# Patient Record
Sex: FEMALE | Race: BLACK OR AFRICAN AMERICAN | Employment: OTHER | URBAN - METROPOLITAN AREA
[De-identification: names, ages, dates, MRNs, and addresses within clinical notes are randomized per-mention and may not be internally consistent; named-entity substitution may affect disease eponyms.]

---

## 2018-02-22 ENCOUNTER — OFFICE VISIT (OUTPATIENT)
Dept: OBGYN CLINIC | Age: 58
End: 2018-02-22

## 2018-02-22 VITALS
DIASTOLIC BLOOD PRESSURE: 85 MMHG | HEIGHT: 65 IN | HEART RATE: 58 BPM | SYSTOLIC BLOOD PRESSURE: 142 MMHG | WEIGHT: 163 LBS | BODY MASS INDEX: 27.16 KG/M2

## 2018-02-22 DIAGNOSIS — Z01.419 WELL WOMAN EXAM WITH ROUTINE GYNECOLOGICAL EXAM: Primary | ICD-10-CM

## 2018-02-22 NOTE — MR AVS SNAPSHOT
Basilia Esquivel [facility] 879 [address] [facility] 320 [facility] 83 [address] 
[phone] Patient: Shania Plaza MRN: [id number] :1960 Visit Information Date & Time Provider Department Dept. Phone Encounter #  
 2018  2:00 PM Basil Martinez MD [facility] OB/GYN [place] [phone] [id number] Follow-up Instructions Return in about 2 years (around 2020). Upcoming Health Maintenance Date Due Hepatitis C Screening 1960 DTaP/Tdap/Td series (1 - Tdap) 1981 PAP AKA CERVICAL CYTOLOGY 1981 BREAST CANCER SCRN MAMMOGRAM 2010 FOBT Q 1 YEAR AGE 50-75 2010 Influenza Age 5 to Adult 2017 Allergies as of 2018  Review Complete On: 2018 By: Mone Patrick Severity Noted Reaction Type Reactions Augmentin [Amoxicillin-pot Clavulanate]  2018    Hives Egg  2018    Hives Milk  2018    Hives Shellfish Derived  2018    Hives Current Immunizations  Never Reviewed No immunizations on file. Not reviewed this visit You Were Diagnosed With   
  
 Codes Comments Well woman exam with routine gynecological exam    -  Primary ICD-10-CM: Q36.754 ICD-9-CM: V72.31 Vitals BP Pulse Height(growth percentile) Weight(growth percentile) BMI OB Status 142/85 (!) 58 5' 5\" (1.651 m) 163 lb (73.9 kg) 27.12 kg/m2 Hysterectomy Smoking Status Never Smoker BMI and BSA Data Body Mass Index Body Surface Area  
 27.12 kg/m 2 1.84 m 2 Your Updated Medication List  
  
Notice  As of 2018  3:15 PM  
 You have not been prescribed any medications. Follow-up Instructions Return in about 2 years (around 2020). Patient Instructions Well Visit, Women 48 to 72: Care Instructions Your Care Instructions Physical exams can help you stay healthy.  Your doctor has checked your overall health and may have suggested ways to take good care of yourself. He or she also may have recommended tests. At home, you can help prevent illness with healthy eating, regular exercise, and other steps. Follow-up care is a key part of your treatment and safety. Be sure to make and go to all appointments, and call your doctor if you are having problems. It's also a good idea to know your test results and keep a list of the medicines you take. How can you care for yourself at home? · Reach and stay at a healthy weight. This will lower your risk for many problems, such as obesity, diabetes, heart disease, and high blood pressure. · Get at least 30 minutes of exercise on most days of the week. Walking is a good choice. You also may want to do other activities, such as running, swimming, cycling, or playing tennis or team sports. · Do not smoke. Smoking can make health problems worse. If you need help quitting, talk to your doctor about stop-smoking programs and medicines. These can increase your chances of quitting for good. · Protect your skin from too much sun. When you're outdoors from 10 a.m. to 4 p.m., stay in the shade or cover up with clothing and a hat with a wide brim. Wear sunglasses that block UV rays. Even when it's cloudy, put broad-spectrum sunscreen (SPF 30 or higher) on any exposed skin. · See a dentist one or two times a year for checkups and to have your teeth cleaned. · Wear a seat belt in the car. · Limit alcohol to 1 drink a day. Too much alcohol can cause health problems. Follow your doctor's advice about when to have certain tests. These tests can spot problems early. · Cholesterol. Your doctor will tell you how often to have this done based on your age, family history, or other things that can increase your risk for heart attack and stroke. · Blood pressure.  Have your blood pressure checked during a routine doctor visit. Your doctor will tell you how often to check your blood pressure based on your age, your blood pressure results, and other factors. · Mammogram. Ask your doctor how often you should have a mammogram, which is an X-ray of your breasts. A mammogram can spot breast cancer before it can be felt and when it is easiest to treat. · Pap test and pelvic exam. Ask your doctor how often you should have a Pap test. You may not need to have a Pap test as often as you used to. · Vision. Have your eyes checked every year or two or as often as your doctor suggests. Some experts recommend that you have yearly exams for glaucoma and other age-related eye problems starting at age 48. · Hearing. Tell your doctor if you notice any change in your hearing. You can have tests to find out how well you hear. · Diabetes. Ask your doctor whether you should have tests for diabetes. · Colon cancer. You should begin tests for colon cancer at age 48. You may have one of several tests. Your doctor will tell you how often to have tests based on your age and risk. Risks include whether you already had a precancerous polyp removed from your colon or whether your parents, sisters and brothers, or children have had colon cancer. · Thyroid disease. Talk to your doctor about whether to have your thyroid checked as part of a regular physical exam. Women have an increased chance of a thyroid problem. · Osteoporosis. You should begin tests for bone density at age 72. If you are younger than 72, ask your doctor whether you have factors that may increase your risk for this disease. You may want to have this test before age 72. · Heart attack and stroke risk. At least every 4 to 6 years, you should have your risk for heart attack and stroke assessed. Your doctor uses factors such as your age, blood pressure, cholesterol, and whether you smoke or have diabetes to show what your risk for a heart attack or stroke is over the next 10 years. When should you call for help? Watch closely for changes in your health, and be sure to contact your doctor if you have any problems or symptoms that concern you. Where can you learn more? Go to http://mendel-amado.info/. Enter M282 in the search box to learn more about \"Well Visit, Women 50 to 72: Care Instructions. \" Current as of: May 12, 2017 Content Version: 11.4 © 1084-9887 [employer]. Care instructions adapted under license by [employer] (which disclaims liability or warranty for this information). If you have questions about a medical condition or this instruction, always ask your healthcare professional. [address] any warranty or liability for your use of this information. Introducing \[facility]\"" & HEALTH SERVICES! Marcia White introduces Jia.com patient portal. Now you can access parts of your medical record, email your doctor's office, and request medication refills online. 1. In your internet browser, go to [email or web address] 2. Click on the First Time User? Click Here link in the Sign In box. You will see the New Member Sign Up page. 3. Enter your Jia.com Access Code exactly as it appears below. You will not need to use this code after youve completed the sign-up process. If you do not sign up before the expiration date, you must request a new code. · Jia.com Access Code: [email or web address] Expires: 5/23/2018  3:14 PM 
 
4. Enter the last four digits of your Social Security Number (xxxx) and Date of Birth (mm/dd/yyyy) as indicated and click Submit. You will be taken to the next sign-up page. 5. Create a Nimsoftt ID. This will be your Jia.com login ID and cannot be changed, so think of one that is secure and easy to remember. 6. Create a Jia.com password. You can change your password at any time. 7. Enter your Password Reset Question and Answer.  This can be used at a later time if you forget your password. 8. Enter your e-mail address. You will receive e-mail notification when new information is available in [address] 9. Click Sign Up. You can now view and download portions of your medical record. 10. Click the Download Summary menu link to download a portable copy of your medical information. If you have questions, please visit the Frequently Asked Questions section of the [employer] website. Remember, [employer] is NOT to be used for urgent needs. For medical emergencies, dial 911. Now available from your iPhone and Android! Please provide this summary of care documentation to your next provider. Your primary care clinician is listed as Tori Starr. If you have any questions after today's visit, please call [phone].

## 2018-02-22 NOTE — PATIENT INSTRUCTIONS
Well Visit, Women 48 to 72: Care Instructions  Your Care Instructions    Physical exams can help you stay healthy. Your doctor has checked your overall health and may have suggested ways to take good care of yourself. He or she also may have recommended tests. At home, you can help prevent illness with healthy eating, regular exercise, and other steps. Follow-up care is a key part of your treatment and safety. Be sure to make and go to all appointments, and call your doctor if you are having problems. It's also a good idea to know your test results and keep a list of the medicines you take. How can you care for yourself at home? · Reach and stay at a healthy weight. This will lower your risk for many problems, such as obesity, diabetes, heart disease, and high blood pressure. · Get at least 30 minutes of exercise on most days of the week. Walking is a good choice. You also may want to do other activities, such as running, swimming, cycling, or playing tennis or team sports. · Do not smoke. Smoking can make health problems worse. If you need help quitting, talk to your doctor about stop-smoking programs and medicines. These can increase your chances of quitting for good. · Protect your skin from too much sun. When you're outdoors from 10 a.m. to 4 p.m., stay in the shade or cover up with clothing and a hat with a wide brim. Wear sunglasses that block UV rays. Even when it's cloudy, put broad-spectrum sunscreen (SPF 30 or higher) on any exposed skin. · See a dentist one or two times a year for checkups and to have your teeth cleaned. · Wear a seat belt in the car. · Limit alcohol to 1 drink a day. Too much alcohol can cause health problems. Follow your doctor's advice about when to have certain tests. These tests can spot problems early. · Cholesterol.  Your doctor will tell you how often to have this done based on your age, family history, or other things that can increase your risk for heart attack and stroke. · Blood pressure. Have your blood pressure checked during a routine doctor visit. Your doctor will tell you how often to check your blood pressure based on your age, your blood pressure results, and other factors. · Mammogram. Ask your doctor how often you should have a mammogram, which is an X-ray of your breasts. A mammogram can spot breast cancer before it can be felt and when it is easiest to treat. · Pap test and pelvic exam. Ask your doctor how often you should have a Pap test. You may not need to have a Pap test as often as you used to. · Vision. Have your eyes checked every year or two or as often as your doctor suggests. Some experts recommend that you have yearly exams for glaucoma and other age-related eye problems starting at age 48. · Hearing. Tell your doctor if you notice any change in your hearing. You can have tests to find out how well you hear. · Diabetes. Ask your doctor whether you should have tests for diabetes. · Colon cancer. You should begin tests for colon cancer at age 48. You may have one of several tests. Your doctor will tell you how often to have tests based on your age and risk. Risks include whether you already had a precancerous polyp removed from your colon or whether your parents, sisters and brothers, or children have had colon cancer. · Thyroid disease. Talk to your doctor about whether to have your thyroid checked as part of a regular physical exam. Women have an increased chance of a thyroid problem. · Osteoporosis. You should begin tests for bone density at age 72. If you are younger than 72, ask your doctor whether you have factors that may increase your risk for this disease. You may want to have this test before age 72. · Heart attack and stroke risk. At least every 4 to 6 years, you should have your risk for heart attack and stroke assessed.  Your doctor uses factors such as your age, blood pressure, cholesterol, and whether you smoke or have diabetes to show what your risk for a heart attack or stroke is over the next 10 years. When should you call for help? Watch closely for changes in your health, and be sure to contact your doctor if you have any problems or symptoms that concern you. Where can you learn more? Go to http://mendel-amado.info/. Enter Z918 in the search box to learn more about \"Well Visit, Women 50 to 72: Care Instructions. \"  Current as of: May 12, 2017  Content Version: 11.4  © 5105-3945 [employer]. Care instructions adapted under license by [employer] (which disclaims liability or warranty for this information). If you have questions about a medical condition or this instruction, always ask your healthcare professional. [address] any warranty or liability for your use of this information.

## 2018-02-22 NOTE — PROGRESS NOTES
Subjective:   62 y.o. female for Well Woman Check. She has no concerns today. She had an insurance change and is due for her yearly exam.  No LMP recorded. Patient has had a hysterectomy. Social History: not sexually active. Pertinent past medical hstory: no history of HTN, DVT, CAD, DM, liver disease, migraines or smoking. There is no problem list on file for this patient. Allergies   Allergen Reactions    Augmentin [Amoxicillin-Pot Clavulanate] Hives    Egg Hives    Milk Hives    Shellfish Derived Hives     History reviewed. No pertinent past medical history. Past Surgical History:   Procedure Laterality Date    HX CHOLECYSTECTOMY      HX ORTHOPAEDIC      right knee cartilage    HX PARTIAL HYSTERECTOMY  1995     Family History   Problem Relation Age of Onset    Colon Cancer Mother     Breast Cancer Sister     Colon Cancer Maternal Grandmother     Colon Cancer Sister      Social History   Substance Use Topics    Smoking status: Never Smoker    Smokeless tobacco: Never Used    Alcohol use No        ROS:  Feeling well. No dyspnea or chest pain on exertion. No abdominal pain, change in bowel habits, black or bloody stools. No urinary tract symptoms. GYN ROS: no breast pain or new or enlarging lumps on self exam. No neurological complaints. Objective:     Visit Vitals    /85    Pulse (!) 58    Ht 5' 5\" (1.651 m)    Wt 163 lb (73.9 kg)    BMI 27.12 kg/m2     The patient appears well, alert, oriented x 3, in no distress. ENT normal.  Neck supple. No adenopathy or thyromegaly. MARIANA. Lungs are clear, good air entry, no wheezes, rhonchi or rales. S1 and S2 normal, no murmurs, regular rate and rhythm. Abdomen soft without tenderness, guarding, mass or organomegaly. Extremities show no edema, normal peripheral pulses. Neurological is normal, no focal findings.     BREAST EXAM: breasts appear normal, no suspicious masses, no skin or nipple changes or axillary nodes    PELVIC EXAM: VULVA: normal appearing vulva with no masses, tenderness or lesions, VAGINA: normal appearing vagina with normal color and discharge, no lesions, CERVIX: surgically absent, UTERUS: surgically absent, vaginal cuff well healed, ADNEXA: normal adnexa in size, nontender and no masses    Assessment/Plan:   well woman- no need for further pap smears based on benign history. Mammogram recently done. counseled on breast self exam, menopause, osteoporosis and adequate intake of calcium and vitamin D  return annually or prn    ICD-10-CM ICD-9-CM    1.  Well woman exam with routine gynecological exam Z01.419 V72.31    .

## 2018-04-10 ENCOUNTER — OFFICE VISIT (OUTPATIENT)
Dept: SURGERY | Age: 58
End: 2018-04-10

## 2018-04-10 VITALS — HEIGHT: 65 IN | RESPIRATION RATE: 16 BRPM | BODY MASS INDEX: 27.16 KG/M2 | WEIGHT: 163 LBS

## 2018-04-10 DIAGNOSIS — K64.4 EXTERNAL HEMORRHOID: ICD-10-CM

## 2018-04-10 DIAGNOSIS — A63.0 ANAL CONDYLOMATA: Primary | ICD-10-CM

## 2018-04-10 PROBLEM — M79.89 LEG SWELLING: Status: ACTIVE | Noted: 2017-11-29

## 2018-04-10 NOTE — MR AVS SNAPSHOT
[address] [facility] 
 
 
 [address] [address] 
[phone] Patient: Jo Ann De Los Santos MRN: [id number] :1960 Visit Information Date & Time Provider Department Dept. Phone Encounter #  
 4/10/2018 11:00 AM Mari Peterson MD [facility] [phone] [id number] Your Appointments 2018  3:30 PM  
POST OP with Mari Peterson MD  
[address] [facility] Appt Note: postop from Anal Condyloma. ..src  
 [address] [facility] [address] [facility]  
  
   
 [address] [facility] [address] [place] [address] Upcoming Health Maintenance Date Due Hepatitis C Screening 1960 DTaP/Tdap/Td series (1 - Tdap) 1981 BREAST CANCER SCRN MAMMOGRAM 2010 FOBT Q 1 YEAR AGE 50-75 2010 PAP AKA CERVICAL CYTOLOGY 2015 Influenza Age 5 to Adult 2017 Allergies as of 4/10/2018  Review Complete On: 4/10/2018 By: Mari Peterson MD  
  
 Severity Noted Reaction Type Reactions Augmentin [Amoxicillin-pot Clavulanate]  2018    Hives Egg  2018    Hives Milk  2018    Hives Shellfish Derived  2018    Hives Current Immunizations  Never Reviewed Name Date Tdap 10/23/2012 12:00 AM  
  
 Not reviewed this visit You Were Diagnosed With   
  
 Codes Comments Anal condylomata    -  Primary ICD-10-CM: A63.0 ICD-9-CM: 078.11 External hemorrhoid     ICD-10-CM: K64.4 ICD-9-CM: 846. 3 Vitals Resp Height(growth percentile) Weight(growth percentile) BMI OB Status Smoking Status 16 5' 5\" (1.651 m) 163 lb (73.9 kg) 27.12 kg/m2 Hysterectomy Never Smoker BMI and BSA Data Body Mass Index Body Surface Area  
 27.12 kg/m 2 1.84 m 2 Your Updated Medication List  
  
Notice  As of 4/10/2018 12:21 PM  
 You have not been prescribed any medications. We Performed the Following MN ANOSCOPY DX W/COLLJ SPEC BR/WA SPX WHEN PRFRMD [id number] CPT(R)] Patient Instructions If you have any questions or concerns about today's appointment, the verbal and/or written instructions you were given for follow up care, please call our office at [phone]. [facility] [address] [facility], [address] [place] [address] [facility] 
 
[phone] office [phone] Keith Jacksonkitty Joslyn Giraldo [facility]  [facility] [id number] [place] [address], [address] [facility] [address] 
 
[phone] office main line [phone] main fax PATIENT PRE AND POST OPERATIVE INSTRUCTIONS Hospital: [address] [facility] [address] [place] [address] Road 
[phone] Before Surgery Instructions:  
1) You must have someone available to drive you to and from your procedure and stay with you for the first 24 hours. 2) It is very important that you have nothing to eat or drink after midnight the night before your surgery. This includes chewing gum or sucking on hard candy. Take only heart, blood pressure and cholesterol medications the morning of surgery with only a sip of water. 3) Please stop taking Plavix 10 days prior to your surgery. Stop taking Coumadin 5 days prior to your surgery. Stop taking all Aspirin or Aspirin containing products 7 days prior to your surgery. Stop taking Advil, Motrin, Aleve, and etc. 3 days prior to your surgery. 4) If you take any diabetic medications please consult with your primary care physician on how to take them on the day of your surgery 5) Please stop all Herbal products 2 weeks prior to your surgery. 6) Please arrive at the hospital 2 hours prior to your surgery, unless you have been otherwise instructed. 7) Patients having an operation on their colon will be given a separate instruction sheet on their Bowel Prep. 8) For any pre-operative work up check in at the main entrance to [facility], and then go to Patient Registration. These studies are done on a walk in basis they are open from 7:00am to 5:00pm Monday through Friday. 9) Please wash your surgical site the morning of your surgery with soap and water. 10) If you are of child bearing age you will have pregnancy test done the morning of your surgery as soon as you arrive. 11) You may be contacted to change your surgery time. At times this is necessary due to equipment or staffing needs. Surgery Date and Time:        April 11, 2018                at    11:30      am       
 
Please check in at [facility], enter through the Emergency Room entrance and go up to the second floor. Please check in by   9:30      am  the day of your surgery. You may contact [place] with any questions at [phone]. After Surgery Instructions: You will need to be seen in the office for a follow-up visit 7-14 days after your surgery. Please call after you have had the procedure to make this appointment. Unless otherwise instructed, you may remove your outer bandage and shower 48 hours after your surgery. If you develop a fever greater than 101, have any significant drainage, bleeding, swelling and/or pus of the wound. Please call our office immediately. . 
 
 
  
Introducing [facility] & HEALTH SERVICES! Mahsa Chi introduces [employer] patient portal. Now you can access parts of your medical record, email your doctor's office, and request medication refills online. 1. In your internet browser, go to [email or web address] with [email or web address]. [email or web address] 2. Click on the First Time User? Click Here link in the Sign In box. You will see the New Member Sign Up page. 3. Enter your [employer] Access Code exactly as it appears below. You will not need to use this code after youve completed the sign-up process.  If you do not sign up before the expiration date, you must request a new code. · Bill[employer] Access Code: [email or web address] Expires: 5/23/2018  4:14 PM 
 
4. Enter the last four digits of your Social Security Number (xxxx) and Date of Birth (mm/dd/yyyy) as indicated and click Submit. You will be taken to the next sign-up page. 5. Create a Billtrust ID. This will be your Bill[employer] login ID and cannot be changed, so think of one that is secure and easy to remember. 6. Create a Billtrust password. You can change your password at any time. 7. Enter your Password Reset Question and Answer. This can be used at a later time if you forget your password. 8. Enter your e-mail address. You will receive e-mail notification when new information is available in [address] 9. Click Sign Up. You can now view and download portions of your medical record. 10. Click the Download Summary menu link to download a portable copy of your medical information. If you have questions, please visit the Frequently Asked Questions section of the [employer] website. Remember, [employer] is NOT to be used for urgent needs. For medical emergencies, dial 911. Now available from your iPhone and Android! Please provide this summary of care documentation to your next provider. Your primary care clinician is listed as Panda Mckeon. If you have any questions after today's visit, please call [phone].

## 2018-04-10 NOTE — PATIENT INSTRUCTIONS
If you have any questions or concerns about today's appointment, the verbal and/or written instructions you were given for follow up care, please call our office at [phone]. [facility] - [address], [address] [place] [address]    [phone] office  [phone]      . [facility] - [address]    [phone] office main line  [phone] main fax                  PATIENT PRE AND POST [address]: [address]  [phone]    Before Surgery Instructions:   1) You must have someone available to drive you to and from your procedure and stay with you for the first 24 hours. 2) It is very important that you have nothing to eat or drink after midnight the night before your surgery. This includes chewing gum or sucking on hard candy. Take only heart, blood pressure and cholesterol medications the morning of surgery with only a sip of water. 3) Please stop taking Plavix 10 days prior to your surgery. Stop taking Coumadin 5 days prior to your surgery. Stop taking all Aspirin or Aspirin containing products 7 days prior to your surgery. Stop taking Advil, Motrin, Aleve, and etc. 3 days prior to your surgery. 4) If you take any diabetic medications please consult with your primary care physician on how to take them on the day of your surgery  5) Please stop all Herbal products 2 weeks prior to your surgery. 6) Please arrive at the hospital 2 hours prior to your surgery, unless you have been otherwise instructed. 7) Patients having an operation on their colon will be given a separate instruction sheet on their Bowel Prep. 8) For any pre-operative work up check in at the main entrance to [facility], and then go to Patient Registration.  These studies are done on a walk in basis they are open from 7:00am to 5:00pm Monday through Friday. 9) Please wash your surgical site the morning of your surgery with soap and water. 10) If you are of child bearing age you will have pregnancy test done the morning of your surgery as soon as you arrive. 11) You may be contacted to change your surgery time. At times this is necessary due to equipment or staffing needs. Surgery Date and Time:        April 11, 2018                at    11:30      am          Please check in at [facility], enter through the Emergency Room entrance and go up to the second floor. Please check in by   9:30      am  the day of your surgery. You may contact Susan Carey with any questions at [phone]. After Surgery Instructions: You will need to be seen in the office for a follow-up visit 7-14 days after your surgery. Please call after you have had the procedure to make this appointment. Unless otherwise instructed, you may remove your outer bandage and shower 48 hours after your surgery. If you develop a fever greater than 101, have any significant drainage, bleeding, swelling and/or pus of the wound. Please call our office immediately. Jerman Alvarez

## 2018-04-10 NOTE — PROGRESS NOTES
[facility]  Colon and Rectal Surgery  [address] [address], [address] [place] [address]              Colon and Rectal Surgery        Patient: Naomie Petersen  MRN: [id number]  Date: 4/10/2018     Age:  62 y.o.,      Sex: female    YOB: 1960      Subjective    Ms. Zeke Bagley is an 62 y.o. female referred by Dr. Adonis Cummings. She underwent a colonoscopy exam on 3/21/2018 given her strong family history of colon cancer. The exam was normal except for anal canal tags and hemorrhoids. The tag was biopsied and revealed condyloma acuminata. Otherwise The patient denies any rectal bleeding, change in bowel habits, weight changes, nor any abdominal pain.  denies associated fever. A history of inflammatory bowel disease has not been reported. She has not had previous rectal surgery. Patient also denies constipation, vomiting, diarrhea, bloody stools, mucousy stools, reflux and nausea. Bowel habits are reported as normal without unsual diarrhea, constipation, blood or pain. The family history is positive for colon cancer in her sister, mother and maternal aunt. No past medical history on file. Past Surgical History:   Procedure Laterality Date    HX CHOLECYSTECTOMY      HX COLONOSCOPY  03/21/2018    HX ORTHOPAEDIC  2010    right knee cartilage    HX PARTIAL HYSTERECTOMY  1995       Allergies   Allergen Reactions    Augmentin [Amoxicillin-Pot Clavulanate] Hives    Egg Hives    Milk Hives    Shellfish Derived Hives       Prior to Admission medications    Not on File           Social History     Social History    Marital status: UNKNOWN     Spouse name: N/A    Number of children: N/A    Years of education: N/A     Occupational History    Not on file.      Social History Main Topics    Smoking status: Never Smoker    Smokeless tobacco: Never Used    Alcohol use No    Drug use: No    Sexual activity: Not Currently     Partners: Male     Other Topics Concern    Not on file     Social History Narrative       Family History   Problem Relation Age of Onset    Colon Cancer Mother     Breast Cancer Sister     Colon Cancer Maternal Grandmother     Colon Cancer Sister            Review of Systems:    A comprehensive review of systems was negative except for that written in the History of Present Illness. Objective:        Visit Vitals    Resp 16    Ht 5' 5\" (1.651 m)    Wt 73.9 kg (163 lb)    BMI 27.12 kg/m2       Physical Exam:   GENERAL: alert, cooperative, no distress, appears stated age  LUNG: clear to auscultation bilaterally  HEART: regular rate and rhythm, S1, S2 normal, no murmur, click, rub or gallop  EXTREMITIES:  extremities normal, atraumatic, no cyanosis or edema     Anorectal:  With the patient in the prone position the anus appeared within normal limits except for a benign appearing external hemorrhoid/sentinel tag anteriorly. Digital rectal examination revealed Normal sphincter tone and squeeze pressure. Palpation revealed No Masses. Anoscopy revealed findings of multiple condyloma lesions (1-3 mm) scattered in the anal canal, mostly anteriorly. Assessment / Plan    Ms. Corina Mijares is an 62 y.o. female with mostly anal canal condyloma acuminata. I discussed the treatment options with the patient carefully. Given the location of the disease, I recommended surgical management. The patient was in full agreement. I discussed the details of the procedure as well as the risks of surgery including bleeding, infection, pain, anesthesia complications, possibility of recurrent disease. The patient is willing to accept the risks and would like to proceed with the surgery. Thank you for allowing me to participate in the patient's care.           Luis Smith MD, FACS, FASCRS  Colon and Rectal Surgery  [facility]  Office [phone]  Fax     [phone]  4/10/2018  12:07 PM

## 2018-04-11 ENCOUNTER — HOSPITAL ENCOUNTER (OUTPATIENT)
Age: 58
Setting detail: OUTPATIENT SURGERY
Discharge: HOME | End: 2018-04-11
Attending: COLON & RECTAL SURGERY | Admitting: COLON & RECTAL SURGERY
Payer: COMMERCIAL

## 2018-04-11 ENCOUNTER — ANESTHESIA EVENT (OUTPATIENT)
Dept: SURGERY | Age: 58
End: 2018-04-11
Payer: COMMERCIAL

## 2018-04-11 ENCOUNTER — ANESTHESIA (OUTPATIENT)
Dept: SURGERY | Age: 58
End: 2018-04-11
Payer: COMMERCIAL

## 2018-04-11 VITALS
BODY MASS INDEX: 26.79 KG/M2 | WEIGHT: 161 LBS | TEMPERATURE: 98 F | DIASTOLIC BLOOD PRESSURE: 63 MMHG | RESPIRATION RATE: 16 BRPM | HEART RATE: 66 BPM | OXYGEN SATURATION: 98 % | SYSTOLIC BLOOD PRESSURE: 129 MMHG

## 2018-04-11 DIAGNOSIS — A63.0 CONDYLOMA: Primary | ICD-10-CM

## 2018-04-11 PROCEDURE — 74011000250 HC RX REV CODE- 250

## 2018-04-11 PROCEDURE — 76060000032 HC ANESTHESIA 0.5 TO 1 HR: Performed by: COLON & RECTAL SURGERY

## 2018-04-11 PROCEDURE — 77030008552 HC TBNG SMK EVAC BFLF -A: Performed by: COLON & RECTAL SURGERY

## 2018-04-11 PROCEDURE — 77030034115 HC SHR ENDO COAG HARM FOCS J&J -F: Performed by: COLON & RECTAL SURGERY

## 2018-04-11 PROCEDURE — 77030018823 HC SLV COMPR VENO -B: Performed by: COLON & RECTAL SURGERY

## 2018-04-11 PROCEDURE — 77030032490 HC SLV COMPR SCD KNE COVD -B: Performed by: COLON & RECTAL SURGERY

## 2018-04-11 PROCEDURE — 74011250636 HC RX REV CODE- 250/636: Performed by: ANESTHESIOLOGY

## 2018-04-11 PROCEDURE — 74011000272 HC RX REV CODE- 272: Performed by: COLON & RECTAL SURGERY

## 2018-04-11 PROCEDURE — 76010000138 HC OR TIME 0.5 TO 1 HR: Performed by: COLON & RECTAL SURGERY

## 2018-04-11 PROCEDURE — 88304 TISSUE EXAM BY PATHOLOGIST: CPT | Performed by: COLON & RECTAL SURGERY

## 2018-04-11 PROCEDURE — 74011000250 HC RX REV CODE- 250: Performed by: COLON & RECTAL SURGERY

## 2018-04-11 PROCEDURE — 77030011265 HC ELECTRD BLD HEX COVD -A: Performed by: COLON & RECTAL SURGERY

## 2018-04-11 PROCEDURE — 74011250636 HC RX REV CODE- 250/636

## 2018-04-11 PROCEDURE — 76210000021 HC REC RM PH II 0.5 TO 1 HR: Performed by: COLON & RECTAL SURGERY

## 2018-04-11 PROCEDURE — 77030018836 HC SOL IRR NACL ICUM -A: Performed by: COLON & RECTAL SURGERY

## 2018-04-11 PROCEDURE — 77030013079 HC BLNKT BAIR HGGR 3M -A: Performed by: ANESTHESIOLOGY

## 2018-04-11 PROCEDURE — 77030011640 HC PAD GRND REM COVD -A: Performed by: COLON & RECTAL SURGERY

## 2018-04-11 RX ORDER — LIDOCAINE HYDROCHLORIDE 20 MG/ML
INJECTION, SOLUTION EPIDURAL; INFILTRATION; INTRACAUDAL; PERINEURAL AS NEEDED
Status: DISCONTINUED | OUTPATIENT
Start: 2018-04-11 | End: 2018-04-11 | Stop reason: HOSPADM

## 2018-04-11 RX ORDER — LIDOCAINE HYDROCHLORIDE AND EPINEPHRINE 10; 10 MG/ML; UG/ML
INJECTION, SOLUTION INFILTRATION; PERINEURAL AS NEEDED
Status: DISCONTINUED | OUTPATIENT
Start: 2018-04-11 | End: 2018-04-11 | Stop reason: HOSPADM

## 2018-04-11 RX ORDER — PROPOFOL 10 MG/ML
INJECTION, EMULSION INTRAVENOUS
Status: DISCONTINUED | OUTPATIENT
Start: 2018-04-11 | End: 2018-04-11 | Stop reason: HOSPADM

## 2018-04-11 RX ORDER — FENTANYL CITRATE 50 UG/ML
INJECTION, SOLUTION INTRAMUSCULAR; INTRAVENOUS AS NEEDED
Status: DISCONTINUED | OUTPATIENT
Start: 2018-04-11 | End: 2018-04-11 | Stop reason: HOSPADM

## 2018-04-11 RX ORDER — SODIUM CHLORIDE, SODIUM LACTATE, POTASSIUM CHLORIDE, CALCIUM CHLORIDE 600; 310; 30; 20 MG/100ML; MG/100ML; MG/100ML; MG/100ML
25 INJECTION, SOLUTION INTRAVENOUS CONTINUOUS
Status: DISCONTINUED | OUTPATIENT
Start: 2018-04-11 | End: 2018-04-11 | Stop reason: HOSPADM

## 2018-04-11 RX ORDER — MIDAZOLAM HYDROCHLORIDE 1 MG/ML
INJECTION, SOLUTION INTRAMUSCULAR; INTRAVENOUS AS NEEDED
Status: DISCONTINUED | OUTPATIENT
Start: 2018-04-11 | End: 2018-04-11 | Stop reason: HOSPADM

## 2018-04-11 RX ORDER — OXYCODONE AND ACETAMINOPHEN 5; 325 MG/1; MG/1
1 TABLET ORAL
Qty: 15 TAB | Refills: 0 | Status: SHIPPED | OUTPATIENT
Start: 2018-04-11

## 2018-04-11 RX ORDER — GLUCOSAMINE SULFATE 1500 MG
POWDER IN PACKET (EA) ORAL DAILY
COMMUNITY

## 2018-04-11 RX ORDER — LIDOCAINE HYDROCHLORIDE 10 MG/ML
0.1 INJECTION INFILTRATION; PERINEURAL AS NEEDED
Status: DISCONTINUED | OUTPATIENT
Start: 2018-04-11 | End: 2018-04-11 | Stop reason: HOSPADM

## 2018-04-11 RX ORDER — BACITRACIN 500 [USP'U]/G
OINTMENT TOPICAL AS NEEDED
Status: DISCONTINUED | OUTPATIENT
Start: 2018-04-11 | End: 2018-04-11 | Stop reason: HOSPADM

## 2018-04-11 RX ORDER — BUPIVACAINE HYDROCHLORIDE AND EPINEPHRINE 2.5; 5 MG/ML; UG/ML
INJECTION, SOLUTION EPIDURAL; INFILTRATION; INTRACAUDAL; PERINEURAL AS NEEDED
Status: DISCONTINUED | OUTPATIENT
Start: 2018-04-11 | End: 2018-04-11 | Stop reason: HOSPADM

## 2018-04-11 RX ORDER — LIDOCAINE AND PRILOCAINE 25; 25 MG/G; MG/G
CREAM TOPICAL ONCE
Status: DISCONTINUED | OUTPATIENT
Start: 2018-04-11 | End: 2018-04-11 | Stop reason: HOSPADM

## 2018-04-11 RX ORDER — PROPOFOL 10 MG/ML
INJECTION, EMULSION INTRAVENOUS AS NEEDED
Status: DISCONTINUED | OUTPATIENT
Start: 2018-04-11 | End: 2018-04-11 | Stop reason: HOSPADM

## 2018-04-11 RX ORDER — ONDANSETRON 2 MG/ML
INJECTION INTRAMUSCULAR; INTRAVENOUS AS NEEDED
Status: DISCONTINUED | OUTPATIENT
Start: 2018-04-11 | End: 2018-04-11 | Stop reason: HOSPADM

## 2018-04-11 RX ORDER — LIDOCAINE HYDROCHLORIDE AND EPINEPHRINE 10; 10 MG/ML; UG/ML
30 INJECTION, SOLUTION INFILTRATION; PERINEURAL ONCE
Status: DISCONTINUED | OUTPATIENT
Start: 2018-04-11 | End: 2018-04-11 | Stop reason: HOSPADM

## 2018-04-11 RX ADMIN — MIDAZOLAM HYDROCHLORIDE 2 MG: 1 INJECTION, SOLUTION INTRAMUSCULAR; INTRAVENOUS at 12:37

## 2018-04-11 RX ADMIN — PROPOFOL 100 MCG/KG/MIN: 10 INJECTION, EMULSION INTRAVENOUS at 12:59

## 2018-04-11 RX ADMIN — SODIUM CHLORIDE, SODIUM LACTATE, POTASSIUM CHLORIDE, AND CALCIUM CHLORIDE 25 ML/HR: 600; 310; 30; 20 INJECTION, SOLUTION INTRAVENOUS at 11:37

## 2018-04-11 RX ADMIN — PROPOFOL 40 MG: 10 INJECTION, EMULSION INTRAVENOUS at 12:59

## 2018-04-11 RX ADMIN — FENTANYL CITRATE 100 MCG: 50 INJECTION, SOLUTION INTRAMUSCULAR; INTRAVENOUS at 12:43

## 2018-04-11 RX ADMIN — PROPOFOL 20 MG: 10 INJECTION, EMULSION INTRAVENOUS at 13:08

## 2018-04-11 RX ADMIN — LIDOCAINE HYDROCHLORIDE 50 MG: 20 INJECTION, SOLUTION EPIDURAL; INFILTRATION; INTRACAUDAL; PERINEURAL at 12:59

## 2018-04-11 RX ADMIN — ONDANSETRON 4 MG: 2 INJECTION INTRAMUSCULAR; INTRAVENOUS at 13:02

## 2018-04-11 NOTE — IP AVS SNAPSHOT
[address] [place] [address] [address] 
[phone] Patient: Mariza Akbar MRN: [id number] :1960 About your hospitalization You were admitted on:  2018 You last received care in the:  [facility] PHASE 2 RECOVERY You were discharged on:  2018 Why you were hospitalized Your primary diagnosis was:  Not on File Follow-up Information Follow up With Details Comments Contact Info Marizol Mchugh MD   [address] [place] [address] [address] [phone] Juana Morales MD Schedule an appointment as soon as possible for a visit in 2 weeks  [address] [facility] [address] [address] 
[phone] Your Scheduled Appointments 2018  3:30 PM EDT  
POST OP with Juana Morales MD  
[address] [facility] [address] [facility] [address] [address]  
[phone] Discharge Orders None A check rosa maria indicates which time of day the medication should be taken. My Medications START taking these medications Instructions Each Dose to Equal  
 Morning Noon Evening Bedtime  
 oxyCODONE-acetaminophen 5-325 mg per tablet Commonly known as:  PERCOCET Your last dose was: Your next dose is: Take 1 Tab by mouth every four (4) hours as needed for Pain. Max Daily Amount: 6 Tabs. 1 Tab CONTINUE taking these medications Instructions Each Dose to Equal  
 Morning Noon Evening Bedtime VITAMIN D3 1,000 unit Cap Generic drug:  cholecalciferol Your last dose was: Your next dose is: Take  by mouth daily. Where to Get Your Medications Information on where to get these meds will be given to you by the nurse or doctor. ! Ask your nurse or doctor about these medications oxyCODONE-acetaminophen 5-325 mg per tablet Opioid Education Prescription Opioids: What You Need to Know: 
 
Prescription opioids can be used to help relieve moderate-to-severe pain and are often prescribed following a surgery or injury, or for certain health conditions. These medications can be an important part of treatment but also come with serious risks. Opioids are strong pain medicines. Examples include hydrocodone, oxycodone, fentanyl, and morphine. Heroin is an example of an illegal opioid. It is important to work with your health care provider to make sure you are getting the safest, most effective care. WHAT ARE THE RISKS AND SIDE EFFECTS OF OPIOID USE? Prescription opioids carry serious risks of addiction and overdose, especially with prolonged use. An opioid overdose, often marked by slow breathing, can cause sudden death. The use of prescription opioids can have a number of side effects as well, even when taken as directed. · Tolerance-meaning you might need to take more of a medication for the same pain relief · Physical dependence-meaning you have symptoms of withdrawal when the medication is stopped. Withdrawal symptoms can include nausea, sweating, chills, diarrhea, stomach cramps, and muscle aches. Withdrawal can last up to several weeks, depending on which drug you took and how long you took it. · Increased sensitivity to pain · Constipation · Nausea, vomiting, and dry mouth · Sleepiness and dizziness · Confusion · Depression · Low levels of testosterone that can result in lower sex drive, energy, and strength · Itching and sweating RISKS ARE GREATER WITH:      
· History of drug misuse, substance use disorder, or overdose · Mental health conditions (such as depression or anxiety) · Sleep apnea · Older age (72 years or older) · Pregnancy Avoid alcohol while taking prescription opioids.   Also, unless specifically advised by your health care provider, medications to avoid include: · Benzodiazepines (such as Xanax or Valium) · Muscle relaxants (such as Soma or Flexeril) · Hypnotics (such as Ambien or Lunesta) · Other prescription opioids KNOW YOUR OPTIONS Talk to your health care provider about ways to manage your pain that don't involve prescription opioids. Some of these options may actually work better and have fewer risks and side effects. Options may include: 
· Pain relievers such as acetaminophen, ibuprofen, and naproxen · Some medications that are also used for depression or seizures · Physical therapy and exercise · Counseling to help patients learn how to cope better with triggers of pain and stress. · Application of heat or cold compress · Massage therapy · Relaxation techniques Be Informed Make sure you know the name of your medication, how much and how often to take it, and its potential risks & side effects. IF YOU ARE PRESCRIBED OPIOIDS FOR PAIN: 
· Never take opioids in greater amounts or more often than prescribed. Remember the goal is not to be pain-free but to manage your pain at a tolerable level. · Follow up with your primary care provider to: · Work together to create a plan on how to manage your pain. · Talk about ways to help manage your pain that don't involve prescription opioids. · Talk about any and all concerns and side effects. · Help prevent misuse and abuse. · Never sell or share prescription opioids · Help prevent misuse and abuse. · Store prescription opioids in a secure place and out of reach of others (this may include visitors, children, friends, and family). · Safely dispose of unused/unwanted prescription opioids: Find your community drug take-back program or your pharmacy mail-back program, or flush them down the toilet, following guidance from the Food and Drug Administration (www.fda.gov/Drugs/ResourcesForYou). · Visit www.cdc.gov/drugoverdose to learn about the risks of opioid abuse and overdose. · If you believe you may be struggling with addiction, tell your health care provider and ask for guidance or call Shantell Griggs at 3-148-850-FLYY. Discharge Instructions [facility] [address] [facility], [address] [place], [address] [place] [address] Anal and Rectal Surgery Discharge Instructions These instructions will cover the most common concerns following surgery on the anal area. If you have further questions or problems, please contact our office. The office is open Monday - Friday 8:30 AM to 4:30 pm. If emergencies arise after business hours, the answering service can contact the on-call physician. The number for the office and answering service is [phone]. Recovery from Anesthesia/Sedation · Do not engage in any activity that requires physical/mental coordination, as the medications may cause drowsiness and dizziness. · Do not drive or operate heavy machinery for 24 hours. · Do not consume alcohol, tranquilizers or sleeping medications for 24 hours. · You must have someone home with you today. Activity · You are advised to go directly home. Restrict your activities and rest for a day. · Resume light to normal activity tomorrow unless instructed differently. · Try to avoid sitting for prolonged periods with pressure on the surgical site. Reclining or sitting to one side is better. It may take several weeks to return to normal activity. Fluids and Diet · Begin with a light diet (soup, toast, crackers). · Unless instructed differently, you may advance to regular food. · Avoid heavy, greasy and spicy foods. · Drink plenty of fluids daily. Follow-Up Unless you already have an appointment, call the office ([phone]) when you get home to make an appointment to see your surgeon approximately 2 weeks after discharge from the hospital. Also call the office for: · Fever greater than 101.5 F 
· Inability to urinate for more than 8 hours · Warmth, redness, or foul-smelling drainage from around the incision · Sudden loss of bright red blood from surgical site or rectum (greater than 1/2 cup). Pain Discomfort is common after surgery in this area. Soaking in a Sitz bath or tub of warn water may help with pain. · A narcotic pain medication has been prescribed by your surgeon. Take as directed. · Use your over-the-counter pain medication such as Ibuprofen when you have finished the prescription provided by your surgeon or if you feel that the pain can be managed with these medications. Bowel Regimen Many people find it helps to take fiber supplementation and a stool softener to regulate the bowels after surgery, especially if narcotic pain medication is being used. Colace or Miralax are options. You can take Milk of Magnesia one to two tablespoons every 4-6 hours as needed for more severe constipation. Wound Care It is common to have some clear or light yellow or pink-tinged drainage from the incision. Thick, foul-smelling drainage can be a sign of infection. Call the office if this occurs. It is also common to have small amounts of bleeding with bowel movements. Keep the surgical area clean by washing in the shower, tub or sitz bath after bowel movements. Gauze pads or a menstrual pad cab be used to protect clothing from drainage. It is fine to wash the wound in the shower. Avoid putting soap directly into the incision. · Remove packing and/or dressing  from incision in 24 hours. · It is important to take tub or sitz baths at least 4 times daily soaking at least 15 minutes each time. Try to take these baths especially after bowel movements.  
· Keep wound or incision covered with antibiotic ointment and dry gauze after each baths. Please contact our office for any concerns or questions. Bakari Nichole MD, FACS, FASCRS Colon and Rectal Surgery [facility] Office [phone] Fax     [phone] These instructions have been reviewed with me. I understand the above instructions and have no further questions. Responsible Party Signature: ______________________________________ Date: April 11, 2018 Nurse's Signature: ______________________________________ Date: April 11, 2018 DISCHARGE SUMMARY from Nurse PATIENT INSTRUCTIONS: 
 
After general anesthesia or intravenous sedation, for 24 hours or while taking prescription Narcotics: · Limit your activities · Do not drive and operate hazardous machinery · Do not make important personal or business decisions · Do  not drink alcoholic beverages · If you have not urinated within 8 hours after discharge, please contact your surgeon on call. Report the following to your surgeon: 
· Excessive pain, swelling, redness or odor of or around the surgical area · Temperature over 100.5 · Nausea and vomiting lasting longer than 4 hours or if unable to take medications · Any signs of decreased circulation or nerve impairment to extremity: change in color, persistent  numbness, tingling, coldness or increase pain · Any questions What to do at Home: 
Recommended activity: Activity as tolerated and no driving for today. These are general instructions for a healthy lifestyle: No smoking/ No tobacco products/ Avoid exposure to second hand smoke Surgeon General's Warning:  Quitting smoking now greatly reduces serious risk to your health. Obesity, smoking, and sedentary lifestyle greatly increases your risk for illness A healthy diet, regular physical exercise & weight monitoring are important for maintaining a healthy lifestyle You may be retaining fluid if you have a history of heart failure or if you experience any of the following symptoms:  Weight gain of 3 pounds or more overnight or 5 pounds in a week, increased swelling in our hands or feet or shortness of breath while lying flat in bed. Please call your doctor as soon as you notice any of these symptoms; do not wait until your next office visit. Recognize signs and symptoms of STROKE: 
 
F-face looks uneven A-arms unable to move or move unevenly S-speech slurred or non-existent T-time-call 911 as soon as signs and symptoms begin-DO NOT go Back to bed or wait to see if you get better-TIME IS BRAIN. Warning Signs of HEART ATTACK Call 911 if you have these symptoms: 
? Chest discomfort. Most heart attacks involve discomfort in the center of the chest that lasts more than a few minutes, or that goes away and comes back. It can feel like uncomfortable pressure, squeezing, fullness, or pain. ? Discomfort in other areas of the upper body. Symptoms can include pain or discomfort in one or both arms, the back, neck, jaw, or stomach. ? Shortness of breath with or without chest discomfort. ? Other signs may include breaking out in a cold sweat, nausea, or lightheadedness. Don't wait more than five minutes to call [address]! Fast action can save your life. Calling 911 is almost always the fastest way to get lifesaving treatment. Emergency Medical Services staff can begin treatment when they arrive  up to an hour sooner than if someone gets to the hospital by car. The discharge information has been reviewed with the patient. The patient verbalized understanding. Discharge medications reviewed with the patient and appropriate educational materials and side effects teaching were provided. Patient armband removed and given to patient to take home. Patient was informed of the privacy risks if armband lost or stolen. Introducing [facility]! [facility] introduces TouchOfModernt patient portal. Now you can access parts of your medical record, email your doctor's office, and request medication refills online. 1. In your internet browser, go to [email or web address] 2. Click on the First Time User? Click Here link in the Sign In box. You will see the New Member Sign Up page. 3. Enter your Demohour Access Code exactly as it appears below. You will not need to use this code after youve completed the sign-up process. If you do not sign up before the expiration date, you must request a new code. · Demohour Access Code: [email or web address] Expires: 5/23/2018  4:14 PM 
 
4. Enter the last four digits of your Social Security Number (xxxx) and Date of Birth (mm/dd/yyyy) as indicated and click Submit. You will be taken to the next sign-up page. 5. Create a Demohour ID. This will be your Demohour login ID and cannot be changed, so think of one that is secure and easy to remember. 6. Create a Demohour password. You can change your password at any time. 7. Enter your Password Reset Question and Answer. This can be used at a later time if you forget your password. 8. Enter your e-mail address. You will receive e-mail notification when new information is available in [address] 9. Click Sign Up. You can now view and download portions of your medical record. 10. Click the Download Summary menu link to download a portable copy of your medical information. If you have questions, please visit the Frequently Asked Questions section of the Demohour website. Remember, Demohour is NOT to be used for urgent needs. For medical emergencies, dial 911. Now available from your iPhone and Android! Introducing Jasbir Ponce As a [facility] patient, I wanted to make you aware of our electronic visit tool called Jasbir Ponce. [facility] 24/7 allows you to connect within minutes with a medical provider 24 hours a day, seven days a week via a mobile device or tablet or logging into a secure website from your computer. You can access Flyfit from anywhere in the [place]. A virtual visit might be right for you when you have a simple condition and feel like you just dont want to get out of bed, or cant get away from work for an appointment, when your regular [employer] provider is not available (evenings, weekends or holidays), or when youre out of town and need minor care. Electronic visits cost only $49 and if the [employer] 24/7 provider determines a prescription is needed to treat your condition, one can be electronically transmitted to a nearby pharmacy*. Please take a moment to enroll today if you have not already done so. The enrollment process is free and takes just a few minutes. To enroll, please download the [employer] 24/7 brian to your tablet or phone, or visit [email or web address] to enroll on your computer. And, as an [address] [place] [address] patient with a "[employer]" account, the results of your visits will be scanned into your electronic medical record and your primary care provider will be able to view the scanned results. We urge you to continue to see your regular [employer] provider for your ongoing medical care. And while your primary care provider may not be the one available when you seek a Jasbir Tweekaboosilviafin virtual visit, the peace of mind you get from getting a real diagnosis real time can be priceless. For more information on Flyfit, view our Frequently Asked Questions (FAQs) at [email or web address]. Sincerely, 
 
Baron Abdalla MD 
Chief Medical Officer Austyn Zapata *:  certain medications cannot be prescribed via Flyfit Providers Seen During Your Hospitalization Provider Specialty Primary office phone Moses Morrow MD Colon and Rectal Surgery [phone] Your Primary Care Physician (PCP) Primary Care Physician Office Phone Office Fax Bao Rivera [phone] You are allergic to the following Allergen Reactions Augmentin (Amoxicillin-Pot Clavulanate) Hives Egg Hives Milk Hives Shellfish Derived Hives Recent Documentation Weight BMI OB Status Smoking Status 73 kg 26.79 kg/m2 Hysterectomy Never Smoker Emergency Contacts Name Discharge Info Relation Home Work Mobile Richelle Avendaño DISCHARGE CAREGIVER [3] Other Relative [6] [phone] Patient Belongings The following personal items are in your possession at time of discharge: 
  Dental Appliances: None         Home Medications: None   Jewelry: Earrings  Clothing: Pants, Shirt, Footwear, Socks, Jacket/Coat    Other Valuables: Purse Please provide this summary of care documentation to your next provider. Signatures-by signing, you are acknowledging that this After Visit Summary has been reviewed with you and you have received a copy. Patient Signature:  ____________________________________________________________ Date:  ____________________________________________________________  
  
Lele Silva Provider Signature:  ____________________________________________________________ Date:  ____________________________________________________________

## 2018-04-11 NOTE — INTERVAL H&P NOTE
H&P Update:  Dora Garcia was seen and examined. History and physical has been reviewed. The patient has been examined.  There have been no significant clinical changes since the completion of the originally dated History and Physical.    Signed By: Jazz White MD     April 11, 2018 9:52 AM

## 2018-04-11 NOTE — OP NOTES
COLON AND RECTAL SURGERY OPERATIVE NOTE            Procedure Date: 4/11/2018    Indications:  Anal Canal Condyloma     Pre-operative Diagnosis:  Anal Canal Condyloma    Post-operative Diagnosis:  Anal Canal Condyloma, Hypertrophied Anal Papillae    Title of Procedure:   1. Excision and Fulguration of Extensive Anal Canal Condyloma                2. Excision of Hypertrophied Anal Papillae    Surgeon(s): Irineo Malhotra MD    Assistants: Circ-1: Juan Oliva RN  Scrub Tech-1: Frank Quezada  Surg Asst-1: Anisa Mims    Anesthesiologist: Anesthesiologist: Cinthya Salas MD  CRNA: Kingston Duarte CRNA    Anesthesia: MAC    ASA Class: ASA 1 - Normal, healthy patient      Indication for surgery:   The patient is a 62 y.o., [address] female who was recently seen in clinic and was noted to have extensive anal canal condyloma. Due to the severity of the disease process, surgical excision and fulguration was discussed for more definitive management. The patient was informed of the indication for the procedure as well as the risk and complications. I discussed the details of the procedure as well as the risks of surgery including bleeding, infection, pain, anesthesia complications, possibility of recurrent disease. The patient demonstrated good understanding of surgical considerations, risks, benefits and alternatives and was willing to accept the risks of surgery. She elected to proceed with surgery, and therefore the surgery was scheduled. Procedure    Findings:    1. Extensive anal canal condyloma with circumferential lesions but mostly concentrated anteriorly (size 1-3 mm). 2. Hypertrophied anal papillae in the left anterior anal canal.    Procedure Details: The patient was brought to the operating room and placed on the operating room table in the prone position after MAC anesthesia was successfully achieved by the Anesthesiology kristel.  The safety strap was carefully secured and pressure points were carefully padded. The patient was then prepped and draped in the standard sterile fashion. The pneumatic compression boots in the lower extremity were placed prior to surgery. I next injected 20 mL of 1% lidocaine with epinephrine at the operative sites for satisfactory local anesthesia. With the aid of the Duncan retractor, the anal canal was thoroughly evaluated. The patient had the extensive condyloma disease as well as the hypertrophied anal papillae as listed in the Findings section above. No other abnormalities were noted. I proceeded with surgical excision of the larger condyloma lesions sharply. Hemostasis was achieved with electrocautery. Next the other lesions were systematically fulgurated with electrocautery. Both the smoke evacuator and regular suction was used to aspirate the smoke. Once the condyloma lesions were treated, attention was focused on the hypertrophied anal papillae. This was sharply excised, and hemostasis was achieved with electrocautery. With the aid of the Duncan retractor, the anal canal was fully visualized. All the anal canal lesions has been effectively treated. The operative sites were inspected for any bleeding and hemostasis was completely achieved with electrocautery. I next injected approximately 10 mL of 0.25% marcaine at the operative sites for post operative analgesia. A sterile dry dressing was next placed at the anal verge after bacitracin ointment was applied to the wound. The patient tolerated the procedure well and sent to the recovery room in good condition. Needle and sponge counts were correct.         Estimated Blood Loss:  5 mL           Drains: None           Total IV Fluids: 500 mL           Specimens: Sent - Anal condyloma and papillae to Pathology           Implants: None           Complications: None             Disposition: aroused from sedation, and taken to the recovery room in a stable condition           Condition: good    Surgeons Signature:       Luis Smith MD, FACS, FASCRS  Colon and Rectal Surgery  [facility]  Office [phone]  Fax     [phone]  4/11/2018  1:31 PM

## 2018-04-11 NOTE — DISCHARGE INSTRUCTIONS
[facility]  [address] [address], [address] [place] [address]            Anal and Rectal Surgery Discharge Instructions    These instructions will cover the most common concerns following surgery on the anal area. If you have further questions or problems, please contact our office. The office is open Monday - Friday 8:30 AM to 4:30 pm. If emergencies arise after business hours, the answering service can contact the on-call physician. The number for the office and answering service is [phone]. Recovery from Anesthesia/Sedation  · Do not engage in any activity that requires physical/mental coordination, as the medications may cause drowsiness and dizziness. · Do not drive or operate heavy machinery for 24 hours. · Do not consume alcohol, tranquilizers or sleeping medications for 24 hours. · You must have someone home with you today. Activity  · You are advised to go directly home. Restrict your activities and rest for a day. · Resume light to normal activity tomorrow unless instructed differently. · Try to avoid sitting for prolonged periods with pressure on the surgical site. Reclining or sitting to one side is better. It may take several weeks to return to normal activity. Fluids and Diet  · Begin with a light diet (soup, toast, crackers). · Unless instructed differently, you may advance to regular food. · Avoid heavy, greasy and spicy foods. · Drink plenty of fluids daily. Follow-Up  Unless you already have an appointment, call the office ([phone]) when you get home to make an appointment to see your surgeon approximately 2 weeks after discharge from the hospital. Also call the office for:  · Fever greater than 101.5 F  · Inability to urinate for more than 8 hours  · Warmth, redness, or foul-smelling drainage from around the incision  · Sudden loss of bright red blood from surgical site or rectum (greater than 1/2 cup).     Pain  Discomfort is common after surgery in this area. Soaking in a Sitz bath or tub of warn water may help with pain. · A narcotic pain medication has been prescribed by your surgeon. Take as directed. · Use your over-the-counter pain medication such as Ibuprofen when you have finished the prescription provided by your surgeon or if you feel that the pain can be managed with these medications. Bowel Regimen  Many people find it helps to take fiber supplementation and a stool softener to regulate the bowels after surgery, especially if narcotic pain medication is being used. Colace or Miralax are options. You can take Milk of Magnesia one to two tablespoons every 4-6 hours as needed for more severe constipation. Wound Care  It is common to have some clear or light yellow or pink-tinged drainage from the incision. Thick, foul-smelling drainage can be a sign of infection. Call the office if this occurs. It is also common to have small amounts of bleeding with bowel movements. Keep the surgical area clean by washing in the shower, tub or sitz bath after bowel movements. Gauze pads or a menstrual pad cab be used to protect clothing from drainage. It is fine to wash the wound in the shower. Avoid putting soap directly into the incision. · Remove packing and/or dressing  from incision in 24 hours. · It is important to take tub or sitz baths at least 4 times daily soaking at least 15 minutes each time. Try to take these baths especially after bowel movements. · Keep wound or incision covered with antibiotic ointment and dry gauze after each baths. Please contact our office for any concerns or questions. Mary Barton MD, FACS, FASCRS  Colon and Rectal Surgery  [facility]  Office [phone]  Fax     [phone]      These instructions have been reviewed with me. I understand the above instructions and have no further questions.     Responsible Party Signature: ______________________________________    Date: April 11, 2018    Nurse's Signature: ______________________________________    Date: April 11, 2018      DISCHARGE SUMMARY from Nurse    PATIENT INSTRUCTIONS:    After general anesthesia or intravenous sedation, for 24 hours or while taking prescription Narcotics:  · Limit your activities  · Do not drive and operate hazardous machinery  · Do not make important personal or business decisions  · Do  not drink alcoholic beverages  · If you have not urinated within 8 hours after discharge, please contact your surgeon on call. Report the following to your surgeon:  · Excessive pain, swelling, redness or odor of or around the surgical area  · Temperature over 100.5  · Nausea and vomiting lasting longer than 4 hours or if unable to take medications  · Any signs of decreased circulation or nerve impairment to extremity: change in color, persistent  numbness, tingling, coldness or increase pain  · Any questions    What to do at Home:  Recommended activity: Activity as tolerated and no driving for today. These are general instructions for a healthy lifestyle:    No smoking/ No tobacco products/ Avoid exposure to second hand smoke  Surgeon General's Warning:  Quitting smoking now greatly reduces serious risk to your health. Obesity, smoking, and sedentary lifestyle greatly increases your risk for illness    A healthy diet, regular physical exercise & weight monitoring are important for maintaining a healthy lifestyle    You may be retaining fluid if you have a history of heart failure or if you experience any of the following symptoms:  Weight gain of 3 pounds or more overnight or 5 pounds in a week, increased swelling in our hands or feet or shortness of breath while lying flat in bed. Please call your doctor as soon as you notice any of these symptoms; do not wait until your next office visit.     Recognize signs and symptoms of STROKE:    F-face looks uneven    A-arms unable to move or move unevenly    S-speech slurred or non-existent    T-time-call 911 as soon as signs and symptoms begin-DO NOT go       Back to bed or wait to see if you get better-TIME IS BRAIN. Warning Signs of HEART ATTACK     Call 911 if you have these symptoms:   Chest discomfort. Most heart attacks involve discomfort in the center of the chest that lasts more than a few minutes, or that goes away and comes back. It can feel like uncomfortable pressure, squeezing, fullness, or pain.  Discomfort in other areas of the upper body. Symptoms can include pain or discomfort in one or both arms, the back, neck, jaw, or stomach.  Shortness of breath with or without chest discomfort.  Other signs may include breaking out in a cold sweat, nausea, or lightheadedness. Don't wait more than five minutes to call 911 - MINUTES MATTER! Fast action can save your life. Calling 911 is almost always the fastest way to get lifesaving treatment. Emergency Medical Services staff can begin treatment when they arrive -- up to an hour sooner than if someone gets to the hospital by car. The discharge information has been reviewed with the patient. The patient verbalized understanding. Discharge medications reviewed with the patient and appropriate educational materials and side effects teaching were provided. Patient armband removed and given to patient to take home. Patient was informed of the privacy risks if armband lost or stolen.

## 2018-04-11 NOTE — H&P (VIEW-ONLY)
[facility]  Colon and Rectal Surgery  [address] [address]              Colon and Rectal Surgery        Patient: Hanna Salas  MRN: [id number]  Date: 4/10/2018     Age:  62 y.o.,      Sex: female    YOB: 1960      Subjective    Ms. April Beckwith is an 62 y.o. female referred by Dr. Oniel Louise. She underwent a colonoscopy exam on 3/21/2018 given her strong family history of colon cancer. The exam was normal except for anal canal tags and hemorrhoids. The tag was biopsied and revealed condyloma acuminata. Otherwise The patient denies any rectal bleeding, change in bowel habits, weight changes, nor any abdominal pain.  denies associated fever. A history of inflammatory bowel disease has not been reported. She has not had previous rectal surgery. Patient also denies constipation, vomiting, diarrhea, bloody stools, mucousy stools, reflux and nausea. Bowel habits are reported as normal without unsual diarrhea, constipation, blood or pain. The family history is positive for colon cancer in her sister, mother and maternal aunt. No past medical history on file. Past Surgical History:   Procedure Laterality Date    HX CHOLECYSTECTOMY      HX COLONOSCOPY  03/21/2018    HX ORTHOPAEDIC  2010    right knee cartilage    HX PARTIAL HYSTERECTOMY  1995       Allergies   Allergen Reactions    Augmentin [Amoxicillin-Pot Clavulanate] Hives    Egg Hives    Milk Hives    Shellfish Derived Hives       Prior to Admission medications    Not on File           Social History     Social History    Marital status: UNKNOWN     Spouse name: N/A    Number of children: N/A    Years of education: N/A     Occupational History    Not on file.      Social History Main Topics    Smoking status: Never Smoker    Smokeless tobacco: Never Used    Alcohol use No    Drug use: No    Sexual activity: Not Currently     Partners: Male     Other Topics Concern    Not on file     Social History Narrative       Family History   Problem Relation Age of Onset    Colon Cancer Mother     Breast Cancer Sister     Colon Cancer Maternal Grandmother     Colon Cancer Sister            Review of Systems:    A comprehensive review of systems was negative except for that written in the History of Present Illness. Objective:        Visit Vitals    Resp 16    Ht 5' 5\" (1.651 m)    Wt 73.9 kg (163 lb)    BMI 27.12 kg/m2       Physical Exam:   GENERAL: alert, cooperative, no distress, appears stated age  LUNG: clear to auscultation bilaterally  HEART: regular rate and rhythm, S1, S2 normal, no murmur, click, rub or gallop  EXTREMITIES:  extremities normal, atraumatic, no cyanosis or edema     Anorectal:  With the patient in the prone position the anus appeared within normal limits except for a benign appearing external hemorrhoid/sentinel tag anteriorly. Digital rectal examination revealed Normal sphincter tone and squeeze pressure. Palpation revealed No Masses. Anoscopy revealed findings of multiple condyloma lesions (1-3 mm) scattered in the anal canal, mostly anteriorly. Assessment / Plan    Ms. Liana Smith is an 62 y.o. female with mostly anal canal condyloma acuminata. I discussed the treatment options with the patient carefully. Given the location of the disease, I recommended surgical management. The patient was in full agreement. I discussed the details of the procedure as well as the risks of surgery including bleeding, infection, pain, anesthesia complications, possibility of recurrent disease. The patient is willing to accept the risks and would like to proceed with the surgery. Thank you for allowing me to participate in the patient's care.           Zahida Gant MD, FACS, FASCRS  Colon and Rectal Surgery  [facility]  Office [phone]  Fax     [phone]  4/10/2018  12:07 PM

## 2018-04-11 NOTE — ANESTHESIA PREPROCEDURE EVALUATION
Anesthetic History   No history of anesthetic complications            Review of Systems / Medical History  Patient summary reviewed and pertinent labs reviewed    Pulmonary  Within defined limits                 Neuro/Psych   Within defined limits           Cardiovascular                  Exercise tolerance: <4 METS     GI/Hepatic/Renal  Within defined limits              Endo/Other  Within defined limits           Other Findings   Comments: Documentation of current medication  Current medications obtained, documented and obtained? YES      Risk Factors for Postoperative nausea/vomiting:       History of postoperative nausea/vomiting? NO       Female? YES       Motion sickness? NO       Intended opioid administration for postoperative analgesia? NO      Smoking Abstinence:  Current Smoker? NO  Elective Surgery? YES  Seen preoperatively by anesthesiologist or proxy prior to day of surgery? YES  Pt abstained from smoking 24 hours prior to anesthesia?  N/A    Preventive care/screening for High Blood Pressure:  Aged 18 years and older: YES  Screened for high blood pressure: YES  Patients with high blood pressure referred to primary care provider   for BP management: YES                 Physical Exam    Airway  Mallampati: II  TM Distance: 4 - 6 cm  Neck ROM: normal range of motion   Mouth opening: Normal     Cardiovascular  Regular rate and rhythm,  S1 and S2 normal,  no murmur, click, rub, or gallop             Dental  No notable dental hx       Pulmonary  Breath sounds clear to auscultation               Abdominal  GI exam deferred       Other Findings            Anesthetic Plan    ASA: 1  Anesthesia type: MAC          Induction: Intravenous  Anesthetic plan and risks discussed with: Patient

## 2018-04-11 NOTE — ANESTHESIA POSTPROCEDURE EVALUATION
Post-Anesthesia Evaluation & Assessment    Visit Vitals    /63 (BP 1 Location: Right arm)    Pulse 66    Temp 36.7 °C (98 °F)    Resp 16    Wt 73 kg (161 lb)    SpO2 98%    BMI 26.79 kg/m2       Nausea/Vomiting: no nausea    Post-operative hydration adequate.     Pain score (VAS): 0    Mental status & Level of consciousness: alert and oriented x 3    Neurological status: moves all extremities, sensation grossly intact    Pulmonary status: airway patent, no supplemental oxygen required    Complications related to anesthesia: none    Additional comments:

## 2018-04-12 ENCOUNTER — TELEPHONE (OUTPATIENT)
Dept: SURGERY | Age: 58
End: 2018-04-12

## 2018-04-12 NOTE — TELEPHONE ENCOUNTER
Returned patient call from nurse line to answer question regarding using antibiotic ointment post op. Patient verbalized understanding that this refers to over the counter antibiotic ointment.

## 2018-04-16 ENCOUNTER — TELEPHONE (OUTPATIENT)
Dept: SURGERY | Age: 58
End: 2018-04-16

## 2018-04-16 NOTE — TELEPHONE ENCOUNTER
Returned patient call from nurse line that she was experiencing spotting blood with BM since surgery. Patient states otherwise feeling well since surgery. Explained to patient that this is normal post rectal surgery; concerns only if she experiences heavy bleeding. Patient verbalized understanding. Confirmed post op with Dr. Amelia Orozco 4/23/18.

## 2018-04-23 ENCOUNTER — OFFICE VISIT (OUTPATIENT)
Dept: SURGERY | Age: 58
End: 2018-04-23

## 2018-04-23 VITALS
BODY MASS INDEX: 26.99 KG/M2 | RESPIRATION RATE: 18 BRPM | DIASTOLIC BLOOD PRESSURE: 82 MMHG | TEMPERATURE: 97 F | SYSTOLIC BLOOD PRESSURE: 135 MMHG | HEIGHT: 65 IN | OXYGEN SATURATION: 99 % | WEIGHT: 162 LBS | HEART RATE: 57 BPM

## 2018-04-23 DIAGNOSIS — A63.0 ANAL CONDYLOMATA: Primary | ICD-10-CM

## 2018-04-23 DIAGNOSIS — K62.89 HYPERTROPHIED ANAL PAPILLA: ICD-10-CM

## 2018-04-23 NOTE — PROGRESS NOTES
[facility]  [address] [place] [address], [address] [place] [address]              Patient: Briseyda Moon  Admitted: (Not on file) MRN: [id number]     Age:  62 y.o.,      Sex: female    YOB: 1960       Subjective    Ms. Amairani Chicas is an 62 y.o. female who is status post the following procedure on 4/11/2018:     1. Excision and Fulguration of Extensive Anal Canal Condyloma   2. Excision of Hypertrophied Anal Papillae     The intraoperative findings showed extensive anal canal condyloma with circumferential lesions but mostly concentrated anteriorly (size 1-3 mm) and hypertrophied anal papillae in the left anterior anal canal.    She is currently without complaints and id feeling well. Her bowels are regular, and the patient denies fever. Objective    Vitals:    04/23/18 1534   BP: 135/82   Pulse: (!) 57   Resp: 18   Temp: 97 °F (36.1 °C)   TempSrc: Oral   SpO2: 99%   Weight: 73.5 kg (162 lb)   Height: 5' 5\" (1.651 m)   PainSc:   0 - No pain       Physical Exam:  General: alert, cooperative, no distress, appears stated age  Anorectal:  With the patient in the prone position the anus appeared within normal limits with well healing operative sites. Digital rectal examination revealed Normal sphincter tone and squeeze pressure. Palpation revealed No Masses. Assessment / Plan    Ms. Bell is Doing well postoperatively. The patient will continue the local wound care until the operative site has completely healed. Return in 6 weeks for follow up.           Gladys Burleson MD, FACS, FASCRS  Colon and Rectal Surgery  [facility]  Office [phone]  Fax     [phone]  4/23/2018  3:50 PM

## 2018-04-23 NOTE — PATIENT INSTRUCTIONS
If you have any questions or concerns about today's appointment, the verbal and/or written instructions you were given for follow up care, please call our office at [phone]. [address] [facility] - [address]    [phone] office  [phone]

## 2018-04-23 NOTE — MR AVS SNAPSHOT
[address] [address] 
[phone] Patient: Karen Baig MRN: [id number] :1960 Visit Information Date & Time Provider Department Dept. Phone Encounter #  
 2018  3:30 PM Pascual Moreno MD [facility] [phone] [id number] Upcoming Health Maintenance Date Due Hepatitis C Screening 1960 BREAST CANCER SCRN MAMMOGRAM 2010 FOBT Q 1 YEAR AGE 50-75 2010 PAP AKA CERVICAL CYTOLOGY 2015 Influenza Age 5 to Adult 2017 DTaP/Tdap/Td series (2 - Td) 10/23/2022 Allergies as of 2018  Review Complete On: 2018 By: Chika Damian LPN Severity Noted Reaction Type Reactions Augmentin [Amoxicillin-pot Clavulanate]  2018    Hives Egg  2018    Hives Milk  2018    Hives Shellfish Derived  2018    Hives Current Immunizations  Never Reviewed Name Date Tdap 10/23/2012 12:00 AM  
  
 Not reviewed this visit Vitals BP Pulse Temp Resp Height(growth percentile) Weight(growth percentile) 135/82 (!) 57 97 °F (36.1 °C) (Oral) 18 5' 5\" (1.651 m) 162 lb (73.5 kg) SpO2 BMI OB Status Smoking Status 99% 26.96 kg/m2 Hysterectomy Never Smoker BMI and BSA Data Body Mass Index Body Surface Area  
 26.96 kg/m 2 1.84 m 2 Your Updated Medication List  
  
   
This list is accurate as of 18  3:46 PM.  Always use your most recent med list.  
  
  
  
  
 oxyCODONE-acetaminophen 5-325 mg per tablet Commonly known as:  PERCOCET Take 1 Tab by mouth every four (4) hours as needed for Pain. Max Daily Amount: 6 Tabs. VITAMIN D3 1,000 unit Cap Generic drug:  cholecalciferol Take  by mouth daily. Patient Instructions If you have any questions or concerns about today's appointment, the verbal and/or written instructions you were given for follow up care, please call our office at [phone]. [facility] [place] [address] [place] [address], [address] 
 
[phone] office [phone] Introducing [facility]! [employer] introduces [employer] patient portal. Now you can access parts of your medical record, email your doctor's office, and request medication refills online. 1. In your internet browser, go to [email or web address] 2. Click on the First Time User? Click Here link in the Sign In box. You will see the New Member Sign Up page. 3. Enter your [employer] Access Code exactly as it appears below. You will not need to use this code after youve completed the sign-up process. If you do not sign up before the expiration date, you must request a new code. · [employer] Access Code: [email or web address] Expires: 5/23/2018  4:14 PM 
 
4. Enter the last four digits of your Social Security Number (xxxx) and Date of Birth (mm/dd/yyyy) as indicated and click Submit. You will be taken to the next sign-up page. 5. Create a [employer] ID. This will be your [employer] login ID and cannot be changed, so think of one that is secure and easy to remember. 6. Create a [employer] password. You can change your password at any time. 7. Enter your Password Reset Question and Answer. This can be used at a later time if you forget your password. 8. Enter your e-mail address. You will receive e-mail notification when new information is available in [address] 9. Click Sign Up. You can now view and download portions of your medical record. 10. Click the Download Summary menu link to download a portable copy of your medical information. If you have questions, please visit the Frequently Asked Questions section of the [employer] website. Remember, [employer] is NOT to be used for urgent needs. For medical emergencies, dial 911. Now available from your iPhone and Android! Please provide this summary of care documentation to your next provider. Your primary care clinician is listed as Johnny Hopkins. If you have any questions after today's visit, please call [phone].

## 2018-04-23 NOTE — PROGRESS NOTES
1. Have you been to the ER, urgent care clinic since your last visit? Hospitalized since your last visit? No    2. Have you seen or consulted any other health care providers outside of the [address] [place] [address] since your last visit? Include any pap smears or colon screening. No     Patient presents for post op from [address] [place] [address] on 4/11/18. She reports feeling well at this time.

## 2018-06-06 ENCOUNTER — OFFICE VISIT (OUTPATIENT)
Dept: SURGERY | Age: 58
End: 2018-06-06

## 2018-06-06 VITALS
SYSTOLIC BLOOD PRESSURE: 140 MMHG | DIASTOLIC BLOOD PRESSURE: 82 MMHG | RESPIRATION RATE: 18 BRPM | OXYGEN SATURATION: 99 % | HEIGHT: 65 IN | WEIGHT: 164 LBS | BODY MASS INDEX: 27.32 KG/M2 | HEART RATE: 58 BPM | TEMPERATURE: 97.3 F

## 2018-06-06 DIAGNOSIS — A63.0 CONDYLOMA: Primary | ICD-10-CM

## 2018-06-06 DIAGNOSIS — K62.89 HYPERTROPHY, ANAL PAPILLAE: ICD-10-CM

## 2018-06-06 NOTE — PROGRESS NOTES
[employer] [facility]  [address] [place] [address], [address] [place] [address]              Patient: Lary Akbar  Admitted: (Not on file) MRN: [id number]     Age:  62 y.o.,      Sex: female    YOB: 1960       Subjective    Ms. Bell is an 62 y.o. female who is here for a follow up visit. She is status post the following procedure on 4/11/2018:                           1. Excision and Fulguration of Extensive Anal Canal Condyloma                        2. Excision of Hypertrophied Anal Papillae      The intraoperative findings showed extensive anal canal condyloma with circumferential lesions but mostly concentrated anteriorly (size 1-3 mm) and hypertrophied anal papillae in the left anterior anal canal.     She is currently doing very well without complaints. Her bowels are regular, and the patient denies fever. Objective    Vitals:    06/06/18 1537   BP: 140/82   Pulse: (!) 58   Resp: 18   Temp: 97.3 °F (36.3 °C)   TempSrc: Oral   SpO2: 99%   Weight: 74.4 kg (164 lb)   Height: 5' 5\" (1.651 m)   PainSc:   0 - No pain       Physical Exam:  General: alert, cooperative, no distress, appears stated age  Anorectal:  With the patient in the prone position the anus appeared within normal limits with well healed operative sites. Digital rectal examination revealed Normal sphincter tone and squeeze pressure. Palpation revealed No Masses. Anoscopy revealed no evidence of recurrent disease findings. Assessment / Plan    Ms. Bell is doing well. The patient will return in 3 months for follow up.           Cecil Ibrahim MD, FACS, FASCRS  Colon and Rectal Surgery  [facility]  Office [phone]  Fax     [phone]  6/6/2018  3:45 PM

## 2018-09-18 ENCOUNTER — OFFICE VISIT (OUTPATIENT)
Dept: SURGERY | Age: 58
End: 2018-09-18

## 2018-09-18 VITALS
HEART RATE: 63 BPM | RESPIRATION RATE: 16 BRPM | BODY MASS INDEX: 27.66 KG/M2 | DIASTOLIC BLOOD PRESSURE: 80 MMHG | HEIGHT: 65 IN | OXYGEN SATURATION: 99 % | WEIGHT: 166 LBS | SYSTOLIC BLOOD PRESSURE: 138 MMHG

## 2018-09-18 DIAGNOSIS — A63.0 CONDYLOMA: Primary | ICD-10-CM

## 2018-09-18 RX ORDER — GUAIFENESIN 100 MG/5ML
81 LIQUID (ML) ORAL DAILY
COMMUNITY

## 2018-09-18 RX ORDER — GLUCOSAMINE/CHONDR SU A SOD 750-600 MG
TABLET ORAL
COMMUNITY

## 2018-09-18 NOTE — MR AVS SNAPSHOT
Mo Chaudharying 
 
 
 [address] [address] [address] 
[phone] Patient: Jovana Rothman MRN: [id number] :1960 Visit Information Date & Time Provider Department Dept. Phone Encounter #  
 2018  3:00 PM MD [facility] [phone] [id number] Upcoming Health Maintenance Date Due Hepatitis C Screening 1960 BREAST CANCER SCRN MAMMOGRAM 2010 FOBT Q 1 YEAR AGE 50-75 2010 PAP AKA CERVICAL CYTOLOGY 2015 Influenza Age 5 to Adult 2018 DTaP/Tdap/Td series (2 - Td) 10/23/2022 Allergies as of 2018  Review Complete On: 2018 By: Francisco Saleh MD  
  
 Severity Noted Reaction Type Reactions Augmentin [Amoxicillin-pot Clavulanate]  2018    Hives Egg  2018    Hives Milk  2018    Hives Shellfish Derived  2018    Hives Current Immunizations  Never Reviewed Name Date Tdap 10/23/2012 12:00 AM  
  
 Not reviewed this visit You Were Diagnosed With   
  
 Codes Comments Condyloma    -  Primary ICD-10-CM: A63.0 ICD-9-CM: 078.11 Vitals BP Pulse Resp Height(growth percentile) Weight(growth percentile) SpO2  
 138/80 63 16 5' 5\" (1.651 m) 166 lb (75.3 kg) 99% BMI OB Status Smoking Status 27.62 kg/m2 Hysterectomy Never Smoker Vitals History BMI and BSA Data Body Mass Index Body Surface Area  
 27.62 kg/m 2 1.86 m 2 Your Updated Medication List  
  
   
This list is accurate as of 18  3:26 PM.  Always use your most recent med list.  
  
  
  
  
 aspirin 81 mg chewable tablet Take 81 mg by mouth daily. Biotin 2,500 mcg Cap Take  by mouth. oxyCODONE-acetaminophen 5-325 mg per tablet Commonly known as:  PERCOCET Take 1 Tab by mouth every four (4) hours as needed for Pain. Max Daily Amount: 6 Tabs. VITAMIN D3 1,000 unit Cap Generic drug:  cholecalciferol Take  by mouth daily. Patient Instructions If you have any questions or concerns about today's appointment, the verbal and/or written instructions you were given for follow up care, please call our office at [phone]. [facility] - [address], [place] [phone] office [phone] Introducing hospitals & HEALTH SERVICES! Rogelio Harmon introduces [employer] patient portal. Now you can access parts of your medical record, email your doctor's office, and request medication refills online. 1. In your internet browser, go to [email or web address] 2. Click on the First Time User? Click Here link in the Sign In box. You will see the New Member Sign Up page. 3. Enter your [employer] Access Code exactly as it appears below. You will not need to use this code after youve completed the sign-up process. If you do not sign up before the expiration date, you must request a new code. · [employer] Access Code: [email or web address] Expires: 12/17/2018  3:26 PM 
 
4. Enter the last four digits of your Social Security Number (xxxx) and Date of Birth (mm/dd/yyyy) as indicated and click Submit. You will be taken to the next sign-up page. 5. Create a [employer] ID. This will be your [employer] login ID and cannot be changed, so think of one that is secure and easy to remember. 6. Create a [employer] password. You can change your password at any time. 7. Enter your Password Reset Question and Answer. This can be used at a later time if you forget your password. 8. Enter your e-mail address. You will receive e-mail notification when new information is available in [address] 9. Click Sign Up. You can now view and download portions of your medical record. 10. Click the Download Summary menu link to download a portable copy of your medical information. If you have questions, please visit the Frequently Asked Questions section of the [employer] website. Remember, [employer] is NOT to be used for urgent needs. For medical emergencies, dial 911. Now available from your iPhone and Android! Please provide this summary of care documentation to your next provider. Your primary care clinician is listed as Fern Bella. If you have any questions after today's visit, please call [phone].

## 2018-09-18 NOTE — PATIENT INSTRUCTIONS
If you have any questions or concerns about today's appointment, the verbal and/or written instructions you were given for follow up care, please call our office at [phone].     [facility] - [address]    [phone] office  [phone]

## 2018-09-18 NOTE — PROGRESS NOTES
1. Have you been to the ER, urgent care clinic since your last visit? Hospitalized since your last visit? No    2. Have you seen or consulted any other health care providers outside of the [address] [place] [address] since your last visit? Include any pap smears or colon screening. No     Patient presents for follow up exam for anal condyloma.

## 2019-01-18 ENCOUNTER — TELEPHONE (OUTPATIENT)
Dept: SURGERY | Age: 59
End: 2019-01-18

## 2019-01-18 NOTE — TELEPHONE ENCOUNTER
LVM to schedule 6 month follow up from last appointment on 09- with Dr. Lisa Bowles per omar benson. Please schedule appointment around 03-.

## 2019-02-28 ENCOUNTER — TELEPHONE (OUTPATIENT)
Dept: SURGERY | Age: 59
End: 2019-02-28

## 2019-02-28 NOTE — TELEPHONE ENCOUNTER
LVMTCB and R/S appointment from Dr. Bernarda Turner to [facility].  Dr. Bernarda Turner will not be clinic on 03-

## 2024-04-07 NOTE — MR AVS SNAPSHOT
[address] [place] [id number] 
[phone] Patient: Anna Morin MRN: [id number] :1960 Visit Information Date & Time Provider Department Dept. Phone Encounter #  
 2018  3:30 PM MD [facility] [phone] [id number] Upcoming Health Maintenance Date Due Hepatitis C Screening 1960 BREAST CANCER SCRN MAMMOGRAM 2010 FOBT Q 1 YEAR AGE 50-75 2010 PAP AKA CERVICAL CYTOLOGY 2015 Influenza Age 5 to Adult 2018 DTaP/Tdap/Td series (2 - Td) 10/23/2022 Allergies as of 2018  Review Complete On: 2018 By: Moses Morrow MD  
  
 Severity Noted Reaction Type Reactions Augmentin [Amoxicillin-pot Clavulanate]  2018    Hives Egg  2018    Hives Milk  2018    Hives Shellfish Derived  2018    Hives Current Immunizations  Never Reviewed Name Date Tdap 10/23/2012 12:00 AM  
  
 Not reviewed this visit You Were Diagnosed With   
  
 Codes Comments Condyloma    -  Primary ICD-10-CM: A63.0 ICD-9-CM: 078.11 Hypertrophy, anal papillae     ICD-10-CM: K62.89 ICD-9-CM: 569.49 Vitals BP Pulse Temp Resp Height(growth percentile) Weight(growth percentile) 140/82 (!) 58 97.3 °F (36.3 °C) (Oral) 18 5' 5\" (1.651 m) 164 lb (74.4 kg) SpO2 BMI OB Status Smoking Status 99% 27.29 kg/m2 Hysterectomy Never Smoker BMI and BSA Data Body Mass Index Body Surface Area  
 27.29 kg/m 2 1.85 m 2 Your Updated Medication List  
  
   
This list is accurate as of 18  3:46 PM.  Always use your most recent med list.  
  
  
  
  
 oxyCODONE-acetaminophen 5-325 mg per tablet Commonly known as:  PERCOCET Take 1 Tab by mouth every four (4) hours as needed for Pain. Max Daily Amount: 6 Tabs. VITAMIN D3 1,000 unit Cap Generic drug:  cholecalciferol Take  by mouth daily. We Performed the Following [place] ANOSCOPY DX W/COLLJ SPEC BR/WA SPX WHEN PRFRMD [id number] CPT(R)] Introducing \[facility]\"" & [facility] SERVICES! Enzo Brannon introduces [employer] patient portal. Now you can access parts of your medical record, email your doctor's office, and request medication refills online. 1. In your internet browser, go to [email or web address] 2. Click on the First Time User? Click Here link in the Sign In box. You will see the New Member Sign Up page. 3. Enter your [employer] Access Code exactly as it appears below. You will not need to use this code after youve completed the sign-up process. If you do not sign up before the expiration date, you must request a new code. · [employer] Access Code: [email or web address] Expires: 9/4/2018  3:46 PM 
 
4. Enter the last four digits of your Social Security Number (xxxx) and Date of Birth (mm/dd/yyyy) as indicated and click Submit. You will be taken to the next sign-up page. 5. Create a [employer] ID. This will be your [employer] login ID and cannot be changed, so think of one that is secure and easy to remember. 6. Create a [employer] password. You can change your password at any time. 7. Enter your Password Reset Question and Answer. This can be used at a later time if you forget your password. 8. Enter your e-mail address. You will receive e-mail notification when new information is available in [address] 9. Click Sign Up. You can now view and download portions of your medical record. 10. Click the Download Summary menu link to download a portable copy of your medical information. If you have questions, please visit the Frequently Asked Questions section of the [employer] website. Remember, [employer] is NOT to be used for urgent needs. For medical emergencies, dial 911. Now available from your iPhone and Android! Please provide this summary of care documentation to your next provider. Your primary care clinician is listed as Chacha Cheema. If you have any questions after today's visit, please call [phone]. Doc

## (undated) DEVICE — REM POLYHESIVE ADULT PATIENT RETURN ELECTRODE: Brand: VALLEYLAB

## (undated) DEVICE — STERILE POLYISOPRENE POWDER-FREE SURGICAL GLOVES: Brand: PROTEXIS

## (undated) DEVICE — SPONGE GZ W4XL4IN COT 12 PLY TYP VII WVN C FLD DSGN

## (undated) DEVICE — PAD,ABDOMINAL,5"X9",STERILE,LF,1/PK: Brand: MEDLINE INDUSTRIES, INC.

## (undated) DEVICE — SHEAR HARMONIC FOCUS OEM 9CM --

## (undated) DEVICE — Device

## (undated) DEVICE — SKIN MARKER,REGULAR TIP WITH RULER AND LABELS: Brand: DEVON

## (undated) DEVICE — STERILE LATEX POWDER-FREE SURGICAL GLOVESWITH NITRILE COATING: Brand: PROTEXIS

## (undated) DEVICE — HEX-LOCKING BLADE ELECTRODE: Brand: EDGE

## (undated) DEVICE — NEEDLE HYPO 25GA L1.5IN BLU POLYPR HUB S STL REG BVL STR

## (undated) DEVICE — ABDOMINAL PAD: Brand: DERMACEA

## (undated) DEVICE — SLEEVE COMPR STD 12 IN FOR 165IN CALF COMFORT VENODYNE SYS

## (undated) DEVICE — KENDALL SCD EXPRESS SLEEVES, KNEE LENGTH, MEDIUM: Brand: KENDALL SCD

## (undated) DEVICE — SYR 10ML CTRL LR LCK NSAF LF --

## (undated) DEVICE — SOLUTION IV 1000ML 0.9% SOD CHL

## (undated) DEVICE — TRAY PREP DRY W/ PREM GLV 2 APPL 6 SPNG 2 UNDPD 1 OVERWRAP

## (undated) DEVICE — SMOKE EVACUATION TUBING WITH 8 IN INTEGRAL WAND AND SPONGE GUARD: Brand: BUFFALO FILTER